# Patient Record
Sex: FEMALE | Race: WHITE | NOT HISPANIC OR LATINO
[De-identification: names, ages, dates, MRNs, and addresses within clinical notes are randomized per-mention and may not be internally consistent; named-entity substitution may affect disease eponyms.]

---

## 2021-04-26 ENCOUNTER — TRANSCRIPTION ENCOUNTER (OUTPATIENT)
Age: 61
End: 2021-04-26

## 2021-04-26 ENCOUNTER — APPOINTMENT (OUTPATIENT)
Dept: OBGYN | Facility: CLINIC | Age: 61
End: 2021-04-26
Payer: COMMERCIAL

## 2021-04-26 VITALS
DIASTOLIC BLOOD PRESSURE: 68 MMHG | HEIGHT: 64.5 IN | WEIGHT: 163 LBS | BODY MASS INDEX: 27.49 KG/M2 | SYSTOLIC BLOOD PRESSURE: 120 MMHG | TEMPERATURE: 97.3 F

## 2021-04-26 DIAGNOSIS — R19.00 INTRA-ABDOMINAL AND PELVIC SWELLING, MASS AND LUMP, UNSPECIFIED SITE: ICD-10-CM

## 2021-04-26 DIAGNOSIS — R35.0 FREQUENCY OF MICTURITION: ICD-10-CM

## 2021-04-26 PROCEDURE — 99214 OFFICE O/P EST MOD 30 MIN: CPT | Mod: 25

## 2021-04-26 PROCEDURE — 99396 PREV VISIT EST AGE 40-64: CPT

## 2021-04-26 PROCEDURE — 99072 ADDL SUPL MATRL&STAF TM PHE: CPT

## 2021-04-27 ENCOUNTER — TRANSCRIPTION ENCOUNTER (OUTPATIENT)
Age: 61
End: 2021-04-27

## 2021-04-27 NOTE — DISCUSSION/SUMMARY
[FreeTextEntry1] : Annual preventitive care gyn exam\par Known presumptive pedunculated myoma/pelvic mass\par see pelvic MRI in 2017 and 2019- rx given for another pelvic MRI\par Urinary Frequency- UA and CX sent- pt counselled\par mammo\par pap/hpv\par SBE\par Colonoscopy

## 2021-04-27 NOTE — PHYSICAL EXAM
[Appropriately responsive] : appropriately responsive [Alert] : alert [No Acute Distress] : no acute distress [No Lymphadenopathy] : no lymphadenopathy [Regular Rate Rhythm] : regular rate rhythm [No Murmurs] : no murmurs [Clear to Auscultation B/L] : clear to auscultation bilaterally [Soft] : soft [Non-tender] : non-tender [Non-distended] : non-distended [No Mass] : no mass [Oriented x3] : oriented x3 [Examination Of The Breasts] : a normal appearance [No Masses] : no breast masses were palpable [Labia Majora] : normal [Labia Minora] : normal [Normal] : normal [Uterine Adnexae] : normal [FreeTextEntry8] : R/V CONF, NO GROSS MASSES NOTED

## 2021-04-27 NOTE — HISTORY OF PRESENT ILLNESS
[postmenopausal] : postmenopausal [N] : Patient does not use contraception [Y] : Positive pregnancy history [Yes] : pregnancy [No] : Patient does not have concerns regarding sex [FreeTextEntry1] : 61YO PRESENTS FOR A WELL WOMAN ANNUAL EXAM AND POSSIBLE UTI [TextBox_4] : PT SAYS 2 WEEKS AGO SHE SAW HER PCP WHO TOOK UA.  PT WAS TOLD THAT SHE MIGHT POSSIBLY HAVE A UTI- PT WAS TOLD HAVE A F/U UA WITH GYN VISIT TODAY- PT ONLY FEELS URINARY FREQUENCY- WHICH IS LONG TERM, NO NEW UTI SYMPTOMS [Mammogramdate] : 01/20 [BreastSonogramDate] : 01/250 [PapSmeardate] : 12/19 [BoneDensityDate] : 01/18 [PGHxTotal] : 3 [BannerxLiving] : 2 [TextBox_9] : 12

## 2021-04-28 ENCOUNTER — TRANSCRIPTION ENCOUNTER (OUTPATIENT)
Age: 61
End: 2021-04-28

## 2021-04-28 ENCOUNTER — NON-APPOINTMENT (OUTPATIENT)
Age: 61
End: 2021-04-28

## 2021-04-28 LAB
APPEARANCE: CLEAR
BACTERIA: NEGATIVE
BILIRUBIN URINE: NEGATIVE
BLOOD URINE: NEGATIVE
COLOR: NORMAL
GLUCOSE QUALITATIVE U: NEGATIVE
HYALINE CASTS: 0 /LPF
KETONES URINE: NEGATIVE
LEUKOCYTE ESTERASE URINE: NEGATIVE
MICROSCOPIC-UA: NORMAL
NITRITE URINE: NEGATIVE
PH URINE: 5.5
PROTEIN URINE: NEGATIVE
RED BLOOD CELLS URINE: 1 /HPF
SPECIFIC GRAVITY URINE: 1.01
SQUAMOUS EPITHELIAL CELLS: 0 /HPF
UROBILINOGEN URINE: NORMAL
WHITE BLOOD CELLS URINE: 0 /HPF

## 2021-04-29 ENCOUNTER — TRANSCRIPTION ENCOUNTER (OUTPATIENT)
Age: 61
End: 2021-04-29

## 2021-05-03 LAB
CYTOLOGY CVX/VAG DOC THIN PREP: ABNORMAL
HPV HIGH+LOW RISK DNA PNL CVX: DETECTED

## 2021-06-23 ENCOUNTER — NON-APPOINTMENT (OUTPATIENT)
Age: 61
End: 2021-06-23

## 2021-10-14 ENCOUNTER — TRANSCRIPTION ENCOUNTER (OUTPATIENT)
Age: 61
End: 2021-10-14

## 2021-11-02 ENCOUNTER — TRANSCRIPTION ENCOUNTER (OUTPATIENT)
Age: 61
End: 2021-11-02

## 2021-11-03 ENCOUNTER — APPOINTMENT (OUTPATIENT)
Dept: OBGYN | Facility: CLINIC | Age: 61
End: 2021-11-03
Payer: COMMERCIAL

## 2021-11-03 ENCOUNTER — NON-APPOINTMENT (OUTPATIENT)
Age: 61
End: 2021-11-03

## 2021-11-03 VITALS
WEIGHT: 149 LBS | BODY MASS INDEX: 25.44 KG/M2 | HEART RATE: 67 BPM | DIASTOLIC BLOOD PRESSURE: 109 MMHG | HEIGHT: 64 IN | SYSTOLIC BLOOD PRESSURE: 181 MMHG

## 2021-11-03 DIAGNOSIS — Z12.4 ENCOUNTER FOR SCREENING FOR MALIGNANT NEOPLASM OF CERVIX: ICD-10-CM

## 2021-11-03 DIAGNOSIS — B97.7 PAPILLOMAVIRUS AS THE CAUSE OF DISEASES CLASSIFIED ELSEWHERE: ICD-10-CM

## 2021-11-03 DIAGNOSIS — Z13.820 ENCOUNTER FOR SCREENING FOR OSTEOPOROSIS: ICD-10-CM

## 2021-11-03 PROCEDURE — 99213 OFFICE O/P EST LOW 20 MIN: CPT

## 2021-11-03 RX ORDER — NITROFURANTOIN MACROCRYSTALS 100 MG/1
100 CAPSULE ORAL
Qty: 10 | Refills: 0 | Status: COMPLETED | COMMUNITY
Start: 2021-04-29 | End: 2021-11-03

## 2021-11-03 NOTE — HISTORY OF PRESENT ILLNESS
[Patient reported PAP Smear was normal] : Patient reported PAP Smear was normal [Y] : Positive pregnancy history [No] : Patient does not have concerns regarding sex [postmenopausal] : postmenopausal [FreeTextEntry1] : 60 y/o  here for repeat Pap/ HPV. Hx Pap neg HPV+. Pt very anxious and upset about results. /109 repeat 172/119. Pt reports has had periods of HBP and GP aware. Was never started on medication. Treated for UTI 21. No urinary sx today. [TextBox_4] : Hx myomectomy\par Hx prophylactic oophorectomy\par No FHx breast/ovarian cancer. [Mammogramdate] : 1/31/19 [TextBox_19] : Neg1/31/19 [TextBox_25] : Neg [PapSmeardate] : 4/28/21 [TextBox_31] : HPV+ [BoneDensityDate] : 1/18/18 [TextBox_37] : wnl [HPVDate] : 4/28/21 [TextBox_78] : HPV+ [PGHxTotal] : 3 [[facility]] : 2

## 2021-11-03 NOTE — PHYSICAL EXAM
[Chaperone Declined] : Patient declined chaperone [Vulvar Atrophy] : vulvar atrophy [Labia Majora] : normal [Labia Minora] : normal [Atrophy] : atrophy [Normal] : normal [Uterine Adnexae] : normal [FreeTextEntry8] : unremarkable

## 2021-11-03 NOTE — PLAN
[FreeTextEntry1] : Pap HPV\par Pt revered back to GP (Dr. King) for BP management.\par F/U 6 mo annual\par mmg\par DEXA

## 2021-11-05 ENCOUNTER — TRANSCRIPTION ENCOUNTER (OUTPATIENT)
Age: 61
End: 2021-11-05

## 2021-11-05 LAB — HPV HIGH+LOW RISK DNA PNL CVX: NOT DETECTED

## 2021-11-08 ENCOUNTER — TRANSCRIPTION ENCOUNTER (OUTPATIENT)
Age: 61
End: 2021-11-08

## 2021-11-08 LAB — CYTOLOGY CVX/VAG DOC THIN PREP: NORMAL

## 2021-11-08 RX ORDER — CANDESARTAN CILEXETIL AND HYDROCHLOROTHIAZIDE 32; 25 MG/1; MG/1
TABLET ORAL
Refills: 0 | Status: ACTIVE | COMMUNITY

## 2022-01-18 ENCOUNTER — TRANSCRIPTION ENCOUNTER (OUTPATIENT)
Age: 62
End: 2022-01-18

## 2022-04-01 ENCOUNTER — TRANSCRIPTION ENCOUNTER (OUTPATIENT)
Age: 62
End: 2022-04-01

## 2022-04-28 ENCOUNTER — APPOINTMENT (OUTPATIENT)
Dept: OBGYN | Facility: CLINIC | Age: 62
End: 2022-04-28
Payer: COMMERCIAL

## 2022-04-28 VITALS
HEIGHT: 64 IN | BODY MASS INDEX: 25.27 KG/M2 | WEIGHT: 148 LBS | DIASTOLIC BLOOD PRESSURE: 77 MMHG | SYSTOLIC BLOOD PRESSURE: 135 MMHG

## 2022-04-28 DIAGNOSIS — Z78.9 OTHER SPECIFIED HEALTH STATUS: ICD-10-CM

## 2022-04-28 PROCEDURE — 99396 PREV VISIT EST AGE 40-64: CPT

## 2022-04-28 NOTE — DISCUSSION/SUMMARY
[FreeTextEntry1] : Annual preventitive care gyn exam\par stable pelvic MRI- as of 2021- pedunculated myoma\par Unremarkable exam\par pap/hpv\par breast screening

## 2022-04-28 NOTE — REASON FOR VISIT
[Annual] : an annual visit. [FreeTextEntry2] : 61 year old female presents for annual exam, no gyn complaints

## 2022-04-28 NOTE — HISTORY OF PRESENT ILLNESS
[Patient reported mammogram was normal] : Patient reported mammogram was normal [Patient reported breast sonogram was normal] : Patient reported breast sonogram was normal [Patient reported PAP Smear was normal] : Patient reported PAP Smear was normal [Patient reported bone density results were normal] : Patient reported bone density results were normal [Patient reported colonoscopy was normal] : Patient reported colonoscopy was normal [Y] : Positive pregnancy history [unknown] : Patient is unsure of the date of her LMP [Menarche Age: ____] : age at menarche was [unfilled] [Post-Menopause, No Sxs] : post-menopausal, currently without symptoms [Previously active] : previously active [Women] : women [Vaginal] : vaginal [No] : No [Mammogramdate] : 2021 [BreastSonogramDate] : 2018 [PapSmeardate] : 04/21 [BoneDensityDate] : 2022 [ColonoscopyDate] : 2022 [PGHxTotal] : 3 [[facility] : 2 [B[facility]xLiving] : 2 [PGHxABSpont] : 1 [FreeTextEntry1] : 2008

## 2022-05-02 LAB
CYTOLOGY CVX/VAG DOC THIN PREP: NORMAL
HPV HIGH+LOW RISK DNA PNL CVX: NOT DETECTED

## 2023-05-02 ENCOUNTER — APPOINTMENT (OUTPATIENT)
Dept: OBGYN | Facility: CLINIC | Age: 63
End: 2023-05-02
Payer: COMMERCIAL

## 2023-05-02 VITALS
BODY MASS INDEX: 26.98 KG/M2 | DIASTOLIC BLOOD PRESSURE: 75 MMHG | WEIGHT: 158 LBS | SYSTOLIC BLOOD PRESSURE: 121 MMHG | HEIGHT: 64 IN | HEART RATE: 64 BPM

## 2023-05-02 DIAGNOSIS — D25.9 LEIOMYOMA OF UTERUS, UNSPECIFIED: ICD-10-CM

## 2023-05-02 DIAGNOSIS — Z12.39 ENCOUNTER FOR OTHER SCREENING FOR MALIGNANT NEOPLASM OF BREAST: ICD-10-CM

## 2023-05-02 DIAGNOSIS — Z01.419 ENCOUNTER FOR GYNECOLOGICAL EXAMINATION (GENERAL) (ROUTINE) W/OUT ABNORMAL FINDINGS: ICD-10-CM

## 2023-05-02 PROCEDURE — 99396 PREV VISIT EST AGE 40-64: CPT

## 2023-05-04 LAB — HPV HIGH+LOW RISK DNA PNL CVX: NOT DETECTED

## 2023-05-05 NOTE — PHYSICAL EXAM
[Chaperone Present] : A chaperone was present in the examining room during all aspects of the physical examination [Appropriately responsive] : appropriately responsive [Alert] : alert [No Acute Distress] : no acute distress [No Lymphadenopathy] : no lymphadenopathy [Regular Rate Rhythm] : regular rate rhythm [Soft] : soft [Non-tender] : non-tender [Non-distended] : non-distended [No Mass] : no mass [Oriented x3] : oriented x3 [Examination Of The Breasts] : a normal appearance [No Masses] : no breast masses were palpable [Labia Majora] : normal [Labia Minora] : normal [Normal] : normal [Declined] : Patient declined rectal exam [FreeTextEntry6] : no pelvic masses noted-

## 2023-05-05 NOTE — DISCUSSION/SUMMARY
[FreeTextEntry1] : Annual gyn [facility]\par pt with known 5cm pedunculated myoma- pt referred for pelvic MRI

## 2023-05-05 NOTE — PROCEDURE
[Cervical Pap Smear] : cervical Pap smear [Liquid Base] : liquid base [Tolerated Well] : the patient tolerated the procedure well [No Complications] : there were no complications [de-identified] : hr hpv

## 2023-05-06 LAB — CYTOLOGY CVX/VAG DOC THIN PREP: NORMAL

## 2023-06-03 ENCOUNTER — TRANSCRIPTION ENCOUNTER (OUTPATIENT)
Age: 63
End: 2023-06-03

## 2023-08-16 ENCOUNTER — OFFICE (OUTPATIENT)
Dept: URBAN - METROPOLITAN AREA CLINIC 90 | Facility: CLINIC | Age: 63
Setting detail: OPHTHALMOLOGY
End: 2023-08-16
Payer: COMMERCIAL

## 2023-08-16 DIAGNOSIS — H35.372: ICD-10-CM

## 2023-08-16 DIAGNOSIS — H44.23: ICD-10-CM

## 2023-08-16 DIAGNOSIS — H25.13: ICD-10-CM

## 2023-08-16 DIAGNOSIS — H43.393: ICD-10-CM

## 2023-08-16 PROCEDURE — 92250 FUNDUS PHOTOGRAPHY W/I&R: CPT | Performed by: OPHTHALMOLOGY

## 2023-08-16 PROCEDURE — 92014 COMPRE OPH EXAM EST PT 1/>: CPT | Performed by: OPHTHALMOLOGY

## 2023-08-16 ASSESSMENT — REFRACTION_CURRENTRX
OD_CYLINDER: -1.50
OD_AXIS: 015
OD_OVR_VA: 20/
OS_ADD: +1.50
OS_AXIS: 157
OS_CYLINDER: -1.00
OS_VPRISM_DIRECTION: PROGS
OS_ADD: +1.50
OS_SPHERE: -10.75
OD_OVR_VA: 20/
OD_ADD: +1.50
OS_SPHERE: -10.00
OD_VPRISM_DIRECTION: PROGS
OD_SPHERE: -11.75
OS_CYLINDER: -0.75
OD_VPRISM_DIRECTION: PROGS
OD_SPHERE: -12.00
OS_AXIS: 140
OS_VPRISM_DIRECTION: PROGS
OD_CYLINDER: -1.00
OD_AXIS: 016
OS_OVR_VA: 20/
OD_ADD: +1.50
OS_OVR_VA: 20/

## 2023-08-16 ASSESSMENT — VISUAL ACUITY
OD_BCVA: 20/30+2
OS_BCVA: 20/40+1

## 2023-08-16 ASSESSMENT — REFRACTION_MANIFEST
OS_VA2: 20/20
OS_ADD: +1.50
OD_VA2: 20/20
OD_SPHERE: -11.75
OD_VA1: 20/20
OS_SPHERE: -10.50
OS_AXIS: 140
OS_VA1: 20/25-2
OS_CYLINDER: SPH
OD_CYLINDER: -0.50
OD_AXIS: 035
OD_ADD: +1.50
OD_SPHERE: -11.50
OS_CYLINDER: -0.75
OS_VA1: 20/25+2
OD_CYLINDER: SPH
OS_SPHERE: -10.25
OD_VA1: 20/40+2

## 2023-08-16 ASSESSMENT — REFRACTION_AUTOREFRACTION
OS_AXIS: 168
OD_CYLINDER: -0.50
OS_SPHERE: -11.00
OD_SPHERE: -14.00
OS_CYLINDER: -1.00
OD_AXIS: 018

## 2023-08-16 ASSESSMENT — CONFRONTATIONAL VISUAL FIELD TEST (CVF)
OS_FINDINGS: FULL
OD_FINDINGS: FULL

## 2023-08-16 ASSESSMENT — KERATOMETRY
OS_K1POWER_DIOPTERS: 43.50
METHOD_AUTO_MANUAL: AUTO
OD_AXISANGLE_DEGREES: 096
OS_AXISANGLE_DEGREES: 097
OD_K1POWER_DIOPTERS: 43.25
OS_K2POWER_DIOPTERS: 45.50
OD_K2POWER_DIOPTERS: 44.25

## 2023-08-16 ASSESSMENT — AXIALLENGTH_DERIVED
OD_AL: 29.1312
OD_AL: 30.6961
OS_AL: 28.5736
OS_AL: 28.0821

## 2023-08-16 ASSESSMENT — SPHEQUIV_DERIVED
OS_SPHEQUIV: -10.625
OD_SPHEQUIV: -11.75
OD_SPHEQUIV: -14.25
OS_SPHEQUIV: -11.5

## 2023-08-17 ENCOUNTER — NON-APPOINTMENT (OUTPATIENT)
Age: 63
End: 2023-08-17

## 2024-08-19 ENCOUNTER — OFFICE (OUTPATIENT)
Dept: URBAN - METROPOLITAN AREA CLINIC 90 | Facility: CLINIC | Age: 64
Setting detail: OPHTHALMOLOGY
End: 2024-08-19
Payer: COMMERCIAL

## 2024-08-19 DIAGNOSIS — H43.393: ICD-10-CM

## 2024-08-19 DIAGNOSIS — H44.23: ICD-10-CM

## 2024-08-19 DIAGNOSIS — H35.373: ICD-10-CM

## 2024-08-19 DIAGNOSIS — H25.13: ICD-10-CM

## 2024-08-19 PROCEDURE — 92250 FUNDUS PHOTOGRAPHY W/I&R: CPT | Performed by: OPHTHALMOLOGY

## 2024-08-19 PROCEDURE — 92014 COMPRE OPH EXAM EST PT 1/>: CPT | Performed by: OPHTHALMOLOGY

## 2024-08-19 ASSESSMENT — CONFRONTATIONAL VISUAL FIELD TEST (CVF)
OD_FINDINGS: FULL
OS_FINDINGS: FULL

## 2025-08-21 ENCOUNTER — OFFICE (OUTPATIENT)
Facility: LOCATION | Age: 65
Setting detail: OPHTHALMOLOGY
End: 2025-08-21
Payer: COMMERCIAL

## 2025-08-21 ENCOUNTER — RX ONLY (RX ONLY)
Age: 65
End: 2025-08-21

## 2025-08-21 DIAGNOSIS — H44.23: ICD-10-CM

## 2025-08-21 DIAGNOSIS — H01.002: ICD-10-CM

## 2025-08-21 DIAGNOSIS — H01.001: ICD-10-CM

## 2025-08-21 DIAGNOSIS — H25.13: ICD-10-CM

## 2025-08-21 DIAGNOSIS — H43.393: ICD-10-CM

## 2025-08-21 DIAGNOSIS — H16.223: ICD-10-CM

## 2025-08-21 DIAGNOSIS — H35.40: ICD-10-CM

## 2025-08-21 DIAGNOSIS — H01.004: ICD-10-CM

## 2025-08-21 DIAGNOSIS — H01.005: ICD-10-CM

## 2025-08-21 DIAGNOSIS — H35.373: ICD-10-CM

## 2025-08-21 PROBLEM — H52.7 REFRACTIVE ERROR: Status: ACTIVE | Noted: 2025-08-21

## 2025-08-21 PROCEDURE — 92014 COMPRE OPH EXAM EST PT 1/>: CPT | Performed by: STUDENT IN AN ORGANIZED HEALTH CARE EDUCATION/TRAINING PROGRAM

## 2025-08-21 PROCEDURE — 92134 CPTRZ OPH DX IMG PST SGM RTA: CPT | Performed by: STUDENT IN AN ORGANIZED HEALTH CARE EDUCATION/TRAINING PROGRAM

## 2025-08-21 ASSESSMENT — REFRACTION_MANIFEST
OS_VA1: 20/25-2
OS_SPHERE: -10.50
OS_CYLINDER: -0.75
OD_CYLINDER: SPH
OD_SPHERE: -11.75
OD_SPHERE: -11.50
OS_CYLINDER: SPH
OS_VA2: 20/20
OS_ADD: +1.50
OS_SPHERE: -10.25
OD_CYLINDER: -0.50
OD_VA2: 20/20
OD_VA1: 20/20
OD_ADD: +1.50
OD_VA1: 20/40+2
OS_AXIS: 140
OS_VA1: 20/25+2
OD_AXIS: 035

## 2025-08-21 ASSESSMENT — REFRACTION_CURRENTRX
OD_ADD: +1.50
OS_AXIS: 136
OS_AXIS: 140
OD_CYLINDER: -0.50
OD_OVR_VA: 20/
OS_VPRISM_DIRECTION: PROGS
OS_VPRISM_DIRECTION: PROGS
OS_ADD: +1.50
OS_ADD: +2.25
OD_VPRISM_DIRECTION: PROGS
OD_VPRISM_DIRECTION: PROGS
OS_SPHERE: -9.75
OS_OVR_VA: 20/
OD_AXIS: 016
OD_ADD: +2.25
OD_CYLINDER: -1.00
OS_VPRISM_DIRECTION: PROGS
OS_SPHERE: -10.75
OD_CYLINDER: -2.00
OS_OVR_VA: 20/
OS_AXIS: 157
OS_ADD: +1.50
OD_VPRISM_DIRECTION: PROGS
OD_ADD: +1.50
OD_SPHERE: -12.00
OS_CYLINDER: -0.25
OS_CYLINDER: -1.25
OS_CYLINDER: -0.75
OS_SPHERE: -10.75
OD_AXIS: 015
OS_AXIS: 172
OD_AXIS: 009
OD_VPRISM_DIRECTION: PROGS
OD_SPHERE: -11.75
OD_OVR_VA: 20/
OS_CYLINDER: -1.00
OD_OVR_VA: 20/
OD_SPHERE: -12.00
OD_AXIS: 039
OS_ADD: +1.50
OD_CYLINDER: -1.50
OS_VPRISM_DIRECTION: PROGS
OS_SPHERE: -10.00
OS_OVR_VA: 20/
OD_SPHERE: -11.25
OD_ADD: +1.50

## 2025-08-21 ASSESSMENT — REFRACTION_AUTOREFRACTION
OD_CYLINDER: -0.75
OD_AXIS: 011
OS_CYLINDER: -1.00
OS_AXIS: 159
OD_SPHERE: -14.00
OS_SPHERE: -10.75

## 2025-08-21 ASSESSMENT — KERATOMETRY
METHOD_AUTO_MANUAL: AUTO
OD_AXISANGLE_DEGREES: 092
OS_K2POWER_DIOPTERS: 45.00
OD_K2POWER_DIOPTERS: 44.50
OD_K1POWER_DIOPTERS: 43.50
OS_K1POWER_DIOPTERS: 43.50
OS_AXISANGLE_DEGREES: 083

## 2025-08-21 ASSESSMENT — CONFRONTATIONAL VISUAL FIELD TEST (CVF)
OD_FINDINGS: FULL
OS_FINDINGS: FULL

## 2025-08-21 ASSESSMENT — SUPERFICIAL PUNCTATE KERATITIS (SPK)
OS_SPK: T
OD_SPK: T

## 2025-08-21 ASSESSMENT — LID EXAM ASSESSMENTS
OS_BLEPHARITIS: LLL LUL T
OD_BLEPHARITIS: RLL RUL T

## 2025-08-21 ASSESSMENT — VISUAL ACUITY
OS_BCVA: 20/25-
OD_BCVA: 20/20